# Patient Record
Sex: MALE | Race: WHITE | NOT HISPANIC OR LATINO | Employment: FULL TIME | URBAN - METROPOLITAN AREA
[De-identification: names, ages, dates, MRNs, and addresses within clinical notes are randomized per-mention and may not be internally consistent; named-entity substitution may affect disease eponyms.]

---

## 2024-02-05 ENCOUNTER — OFFICE VISIT (OUTPATIENT)
Dept: PODIATRY | Facility: CLINIC | Age: 34
End: 2024-02-05
Payer: COMMERCIAL

## 2024-02-05 VITALS — BODY MASS INDEX: 24.91 KG/M2 | WEIGHT: 168.19 LBS | HEIGHT: 69 IN

## 2024-02-05 DIAGNOSIS — B07.0 PLANTAR WART: Primary | ICD-10-CM

## 2024-02-05 DIAGNOSIS — M79.605 PAIN OF LEFT LOWER EXTREMITY: ICD-10-CM

## 2024-02-05 DIAGNOSIS — L84 CORN OR CALLUS: ICD-10-CM

## 2024-02-05 DIAGNOSIS — R26.2 DIFFICULTY WALKING: ICD-10-CM

## 2024-02-05 PROCEDURE — 99203 OFFICE O/P NEW LOW 30 MIN: CPT | Mod: 25,S$GLB,, | Performed by: PODIATRIST

## 2024-02-05 PROCEDURE — 3008F BODY MASS INDEX DOCD: CPT | Mod: S$GLB,,, | Performed by: PODIATRIST

## 2024-02-05 PROCEDURE — 1159F MED LIST DOCD IN RCRD: CPT | Mod: S$GLB,,, | Performed by: PODIATRIST

## 2024-02-05 PROCEDURE — 1160F RVW MEDS BY RX/DR IN RCRD: CPT | Mod: S$GLB,,, | Performed by: PODIATRIST

## 2024-02-05 PROCEDURE — 17110 DESTRUCTION B9 LES UP TO 14: CPT | Mod: CSM,S$GLB,, | Performed by: PODIATRIST

## 2024-02-20 NOTE — PROGRESS NOTES
Subjective:     Patient ID: Timothy Rocha is a 34 y.o. male.    Chief Complaint: Tianna Aguirre is a 34 y.o. male who presents to the podiatry clinic  with complaint of  left foot pain. Onset of the symptoms was several weeks ago. Precipitating event: increased activity and regrowth of painful soft tissue lesion . Current symptoms include: ability to bear weight, but with some pain and worsening symptoms after a period of activity. Aggravating factors:  barefoot walking . Symptoms have gradually worsened. Patient has had prior foot problems. Evaluation to date: none. Treatment to date:  occasional home debridement . Patients rates pain 4/10 on pain scale.    Review of Systems   Constitutional: Negative for chills and fever.   Cardiovascular:  Negative for chest pain and leg swelling.   Respiratory:  Negative for cough and shortness of breath.    Gastrointestinal:  Negative for diarrhea, nausea and vomiting.        Objective:     Physical Exam  Vitals reviewed.   Constitutional:       General: He is not in acute distress.     Appearance: Normal appearance. He is not ill-appearing.   HENT:      Head: Normocephalic.      Nose: Nose normal.   Pulmonary:      Effort: Pulmonary effort is normal. No respiratory distress.   Skin:     Capillary Refill: Capillary refill takes 2 to 3 seconds.   Neurological:      Mental Status: He is alert and oriented to person, place, and time.   Psychiatric:         Mood and Affect: Mood normal.         Behavior: Behavior normal.         Thought Content: Thought content normal.       Vascular:  DP and PT pulses palpable 2/4 bilateral foot, capillary fill time less than 3 seconds to distal aspect of the digits bilateral foot   Musculoskeletal:  5/5 muscle strength noted bilateral foot, ankle joint range of motion is full without pain, pain and tenderness with palpation plantar aspect left 5th MPJ at site of soft tissue lesion   Dermatologic:  Hyperkeratotic skin lesion  noted to the plantar left 5th digit with loss of skin lines and central nidus and mild pinpoint bleeding upon debridement, no open lesions noted bilateral foot, no rashes noted bilateral foot, no interdigital maceration noted bilateral foot   Neurologic:  Protective and light touch sensation intact bilateral lower extremity    Assessment:      Encounter Diagnoses   Name Primary?    Plantar wart Yes    Corn or callus     Pain of left lower extremity     Difficulty walking      Plan:     Timothy was seen today for [facility].    Diagnoses and all orders for this visit:    Plantar wart  -     Destruction of Benign Lesion    May or callus    Pain of left lower extremity  -     Destruction of Benign Lesion    Difficulty walking      I counseled the patient on his conditions, their implications and medical management.        1. Patient was examined and evaluated.    2. Discussed with patient etiology of plantar verruca.  Discussed with patient several conservative treatment options for verruca including cryotherapy, surgical excision, cauterization and debridement with topical acid treatments.  Patient was warned of potential recurrence over time.  Patient was dispensed offloading aperture pads for prevention of direct contact.    Destruction of Benign Lesion    Date/Time: 2/5/2024 1:45 PM    Performed by: Brittany Roberts DPM  Authorized by: Brittany Roberts DPM    Consent Done?:  Yes (Verbal)  Indications:     Destruction Indications: plantar verrucae.  Location:     Lower Extremity:  Foot (near plantar left 5th MPJ)    Detail:  Left foot  Procedure Details:     Cosmetic?: Yes      Number of lesions:  1    Destruction Method: debridement with application of salicylic acid under occlusion.    Sterile dressings:  Tegaderm    Bleeding:  Minimal    Hemostasis Achieved: Yes      Method Used:  Pressure     Patient tolerated the procedure well with no immediate complications.     Post-operative instructions were  provided for the patient.      3. Discussed with patient etiology of callus formation.  Patient was warned of potential recurrence over time.  Patient was dispensed offloading pads for reduction of direct contact to the lesion.  Patient was advised to perform safe debridement at home with a emery board, nail file or pumice stone.  Patient was advised to apply ointments / moisturizer to the area for softening purposes.  4. Patient was advised to limit the amount of barefoot walking and use of slides and flip-flops.    5. Patient will follow-up p.r.n. foot complaints